# Patient Record
Sex: MALE | Race: BLACK OR AFRICAN AMERICAN
[De-identification: names, ages, dates, MRNs, and addresses within clinical notes are randomized per-mention and may not be internally consistent; named-entity substitution may affect disease eponyms.]

---

## 2017-08-13 ENCOUNTER — HOSPITAL ENCOUNTER (EMERGENCY)
Dept: HOSPITAL 74 - JER | Age: 52
Discharge: HOME | End: 2017-08-13
Payer: COMMERCIAL

## 2017-08-13 VITALS — HEART RATE: 73 BPM | DIASTOLIC BLOOD PRESSURE: 103 MMHG | SYSTOLIC BLOOD PRESSURE: 158 MMHG | TEMPERATURE: 99 F

## 2017-08-13 VITALS — BODY MASS INDEX: 26.6 KG/M2

## 2017-08-13 DIAGNOSIS — K57.92: Primary | ICD-10-CM

## 2017-08-13 DIAGNOSIS — I10: ICD-10-CM

## 2017-08-13 DIAGNOSIS — K57.90: ICD-10-CM

## 2017-08-13 LAB
ALBUMIN SERPL-MCNC: 3.9 G/DL (ref 3.4–5)
ALP SERPL-CCNC: 70 U/L (ref 45–117)
ALT SERPL-CCNC: 31 U/L (ref 12–78)
AMYLASE SERPL-CCNC: 56 U/L (ref 25–115)
ANION GAP SERPL CALC-SCNC: 7 MMOL/L (ref 8–16)
AST SERPL-CCNC: 14 U/L (ref 15–37)
BASOPHILS # BLD: 0.4 % (ref 0–2)
BILIRUB SERPL-MCNC: 1.1 MG/DL (ref 0.2–1)
CALCIUM SERPL-MCNC: 8.7 MG/DL (ref 8.5–10.1)
CO2 SERPL-SCNC: 27 MMOL/L (ref 21–32)
CREAT SERPL-MCNC: 1 MG/DL (ref 0.7–1.3)
DEPRECATED RDW RBC AUTO: 13.6 % (ref 11.9–15.9)
EOSINOPHIL # BLD: 0.8 % (ref 0–4.5)
GLUCOSE SERPL-MCNC: 99 MG/DL (ref 74–106)
MCH RBC QN AUTO: 29.3 PG (ref 25.7–33.7)
MCHC RBC AUTO-ENTMCNC: 33.2 G/DL (ref 32–35.9)
MCV RBC: 88.2 FL (ref 80–96)
NEUTROPHILS # BLD: 71.2 % (ref 42.8–82.8)
PLATELET # BLD AUTO: 189 K/MM3 (ref 134–434)
PMV BLD: 8.9 FL (ref 7.5–11.1)
PROT SERPL-MCNC: 7.3 G/DL (ref 6.4–8.2)
WBC # BLD AUTO: 12.6 K/MM3 (ref 4–10)

## 2017-08-13 PROCEDURE — 3E033NZ INTRODUCTION OF ANALGESICS, HYPNOTICS, SEDATIVES INTO PERIPHERAL VEIN, PERCUTANEOUS APPROACH: ICD-10-PCS

## 2017-08-13 NOTE — PDOC
History of Present Illness





- General


History Source: Patient


Exam Limitations: No Limitations





- History of Present Illness


Initial Comments: 


08/13/17 05:13


The patient is a 51-year-old male, with a significant past medical history of 

HTN and diverticulitis, who presents to the ED with 2 days of abdominal 

bloating and gas. Pts states that his abdominal pain is intermittent, 8/10 in 

severity. He states that this does not feel like his previous episodes of 

diverticulitis. Last episode of diverticulitis was 5 years ago. 


 


Patient denies any flank pain. He denies any chest pain. He denies any urinary 

changes.














<Dayana Orellana - Last Filed: 08/13/17 05:13>





<Yodit Núñez - Last Filed: 08/13/17 06:59>





- General


Stated Complaint: STOMACH PAIN


Time Seen by Provider: 08/13/17 05:01





Past History





<Dayana Orellana - Last Filed: 08/13/17 05:13>





- Past Medical History


HTN: Yes





- Psycho/Social/Smoking Cessation Hx


Suicidal Ideation: No


Smoking History: Never smoked





<Yodit Núñez - Last Filed: 08/13/17 06:59>





- Past Medical History


Allergies/Adverse Reactions: 


 Allergies











Allergy/AdvReac Type Severity Reaction Status Date / Time


 


No Known Allergies Allergy   Verified 08/13/17 05:12











Home Medications: 


Ambulatory Orders





Losartan Potassium 50 mg PO DAILY 09/23/16 











**Review of Systems





- Review of Systems


Able to Perform ROS?: Yes


Comments:: 


08/13/17 05:13


CONSTITUTIONAL:


Absent: fever, chills, diaphoresis, generalized weakness, malaise, loss of 

appetite


HEENT:


Absent: rhinorrhea, nasal congestion, throat pain, throat swelling, difficulty 

swallowing, 


mouth swelling, ear pain, eye pain, visual Changes


CARDIOVASCULAR:


Absent: chest pain, syncope, palpitations, irregular heart rate, lightheadedness

, peripheral 


edema


RESPIRATORY: 


Absent: cough, shortness of breath, dyspnea with exertion, orthopnea, wheezing, 

stridor, 


hemoptysis


GASTROINTESTINAL:


Present: abdominal pain, abdominal distension/gas


Absent: nausea, vomiting, diarrhea, constipation, 


melena, hematochezia


GENITOURINARY: 


Absent: dysuria, frequency, urgency, hesitancy, hematuria, flank pain, genital 

pain


MUSCULOSKELETAL:


Absent: myalgia, arthralgia, joint swelling


SKIN: 


Absent: rash, itching, pallor


HEMATOLOGIC/IMMUNOLOGIC: 


Absent: easy bleeding, easy bruising, lymphadenopathy, frequent infections


ENDOCRINE:


Absent: unexplained weight gain, unexplained weight loss, heat intolerance, 

cold intolerance


NEUROLOGIC: 


Absent: headache, focal weakness or paresthesias, dizziness, unsteady gait, 

seizure, mental 


status changes, bladder or bowel incontinence   


PSYCHIATRIC: 


Absent: anxiety, depression, suicidal or homicidal ideation, hallucinations.








<Dayana Orellana - Last Filed: 08/13/17 05:13>





*Physical Exam





- Physical Exam


Comments: 


08/13/17 05:15


GENERAL:


Well developed, well nourished. Awake and alert. No acute distress.


HEENT:


Normocephalic, atraumatic. PERRLA, EOMI. No conjunctival pallor. Sclera are non-

icteric. Moist mucous 


membranes. Oropharynx is clear.


NECK: 


Supple. Full ROM. No JVD. Carotid pulses 2+ and symmetric, without bruits. No 

thyromegaly. No 


lymphadenopathy.


CARDIOVASCULAR:


Regular rate and rhythm. No murmurs, rubs, or gallops. Distal pulses are 2+ and 

symmetric. 


PULMONARY: 


No evidence of respiratory distress. Lungs clear to auscultation bilaterally. 

No wheezing, rales or rhonchi.


ABDOMINAL:


Soft. Non-tender. Non-distended. No rebound or guarding. No organomegaly. 

Normoactive bowel sounds. 


MUSCULOSKELETAL 


(+)Gassy bowel sounds. Normal range of motion at all joints. No bony 

deformities or tenderness. No CVA tenderness.


EXTREMITIES: 


No cyanosis. No clubbing. No edema. No calf tenderness.


SKIN: 


Warm and dry. Normal capillary refill. No rashes. No jaundice. 


NEUROLOGICAL: 


Alert, awake, appropriate. Cranial nerves 2-12 intact. 


PSYCHIATRIC: 


Cooperative. Good eye contact. Appropriate mood and affect.








<Dayana Orellana - Last Filed: 08/13/17 05:13>





ED Treatment Course





- LABORATORY


CBC & Chemistry Diagram: 


 08/13/17 05:06





 08/13/17 05:06





<Yodit Núñez - Last Filed: 08/13/17 06:59>





Medical Decision Making





- Medical Decision Making


08/13/17 06:34


Labs normal; awaiting CT scan





08/13/17 06:58


Pt comes with gassy sensation and abdominal pain that is diffuse.  Doesn't feel 

like diverticulitis which he has had in the past.


Pt has constipation and decreased BMs; he wonders if he has obstruction.


Pt appears well, but he has pain.  He will be hydrated, as he has decreased 

appetite and morphine.





Labs normal.  Pt drank oral contrast.  CT scan abd/pelvis at 7AM


Sign out to morning ER doc.





<Yodit Núñez - Last Filed: 08/13/17 06:59>





*DC/Admit/Observation/Transfer





- Attestations


Scribe Attestion: 


08/13/17 05:16





Documentation prepared by Dayana Orellana, acting as medical scribe for Yodit Núñez MD.





<Dayana Orellana - Last Filed: 08/13/17 05:13>

## 2017-08-13 NOTE — PDOC
*Physical Exam





- Vital Signs


 Last Vital Signs











Temp Pulse Resp BP Pulse Ox


 


 99.0 F   73   20   158/103   100 


 


 08/13/17 05:02  08/13/17 05:02  08/13/17 05:02  08/13/17 05:02  08/13/17 05:02














ED Treatment Course





- LABORATORY


CBC & Chemistry Diagram: 


 08/13/17 05:06





 08/13/17 05:06





- ADDITIONAL ORDERS


Additional order review: 


 Laboratory  Results











  08/13/17





  05:06


 


Sodium  139


 


Potassium  4.1


 


Chloride  105


 


Carbon Dioxide  27


 


Anion Gap  7 L


 


BUN  16


 


Creatinine  1.0


 


Creat Clearance w eGFR  > 60


 


Random Glucose  99


 


Calcium  8.7


 


Total Bilirubin  1.1 H


 


AST  14 L


 


ALT  31


 


Alkaline Phosphatase  70


 


Total Protein  7.3


 


Albumin  3.9


 


Total Amylase  56


 


Lipase  61 L








 











  08/13/17





  05:06


 


RBC  5.02


 


MCV  88.2


 


MCHC  33.2


 


RDW  13.6


 


MPV  8.9


 


Neutrophils %  71.2


 


Lymphocytes %  17.9


 


Monocytes %  9.7


 


Eosinophils %  0.8


 


Basophils %  0.4














- Medications


Given in the ED: 


ED Medications














Discontinued Medications














Generic Name Dose Route Start Last Admin





  Trade Name Freq  PRN Reason Stop Dose Admin


 


Morphine Sulfate  2 mg 08/13/17 05:06 08/13/17 05:12





  Morphine Injection -  IVPUSH 08/13/17 05:07  2 mg





  ONCE ONE   Administration


 


Sodium Chloride  1,000 ml 08/13/17 05:06 08/13/17 05:25





  Normal Saline -  IV 08/13/17 05:07  1,000 ml





  ONCE ONE   Administration














Medical Decision Making





- Medical Decision Making





08/13/17 08:16





Sign-out received from outgoing Emergency Physician Dr. Núñez


Pt interviewed and examined


Ancillary studies reviewed





Case discussed in detail with oncoming Emergency Physician including history, 

physical exam and ancillary studies.  





 Vital Signs











Temp Pulse Resp BP Pulse Ox


 


 99.0 F   73   20   158/103   100 


 


 08/13/17 05:02  08/13/17 05:02  08/13/17 05:02  08/13/17 05:02  08/13/17 05:02








Signed out the 51-year-old male with history of hypertension presenting with 

lower abdominal pain. Blood work reviewed and demonstrates a mild leukocytosis 

of 12 and a CAT scan abdomen pelvis demonstrates acute diverticulitis with no 

other competitions. Patient reports feeling much better and wanted to go home. 

We'll write 2 week prescription of ciprofloxacin and Flagyl. Patient has follow-

up with his gastroenterologist and primary care physician. Return precautions 

given.





I discussed the physical exam findings, ancillary test results and final 

diagnoses with the patient.  I answered all of the patient's questions.  The 

patient was satisfied with the care received and felt comfortable with the 

discharge plan and treatment plan.  The patient will call their primary care 

physician within 24 hours to arrange follow-up and will return to the Emergency 

Department with any new, persistant or worsening symptoms. 





*DC/Admit/Observation/Transfer


Diagnosis at time of Disposition: 


Diverticulitis


Qualifiers:


 Diverticulitis site: unspecified part of intestinal tract Diverticulitis 

bleeding: without bleeding Diverticulitis complication: unspecified 

complication status Qualified Code(s): K57.92 - Diverticulitis of intestine, 

part unspecified, without perforation or abscess without bleeding





- Discharge Dispostion


Disposition: HOME


Admit: No





- Prescriptions


Prescriptions: 


Ciprofloxacin [Cipro -] 500 mg PO Q12H #28 tablet


Metronidazole [Flagyl -] 500 mg PO Q8H #42 tablet


Naproxen [Naprosyn -] 500 mg PO BID PRN #20 tablet


 PRN Reason: Pain





- Referrals


Referrals: 


Krystal Marrero MD [Primary Care Provider] - 





- Patient Instructions


Printed Discharge Instructions:  DI for Diverticulitis


Additional Instructions: 


Please take your antibiotics (ciprofloxacin and flagyl) as prescribed for 2 

weeks.


Follow up with your doctor.


Drink plenty of fluids and rest.

## 2017-10-17 ENCOUNTER — HOSPITAL ENCOUNTER (EMERGENCY)
Dept: HOSPITAL 74 - JERFT | Age: 52
Discharge: HOME | End: 2017-10-17
Payer: COMMERCIAL

## 2017-10-17 VITALS — SYSTOLIC BLOOD PRESSURE: 167 MMHG | DIASTOLIC BLOOD PRESSURE: 97 MMHG | TEMPERATURE: 98.1 F | HEART RATE: 82 BPM

## 2017-10-17 VITALS — BODY MASS INDEX: 27.3 KG/M2

## 2017-10-17 DIAGNOSIS — H10.33: Primary | ICD-10-CM

## 2017-10-17 NOTE — PDOC
History of Present Illness





- General


Chief Complaint: Eye Problem


Stated Complaint: EYE PROBLEM


Time Seen by Provider: 10/17/17 16:56


History Source: Patient





- History of Present Illness


Timing/Duration: other


Severity: moderate





Past History





- Past Medical History


Allergies/Adverse Reactions: 


 Allergies











Allergy/AdvReac Type Severity Reaction Status Date / Time


 


No Known Allergies Allergy   Verified 10/17/17 16:37











Home Medications: 


Ambulatory Orders





Losartan Potassium 50 mg PO DAILY 09/23/16 


Diphenhydramine HCl [Benadryl -] 25 mg PO Q6H #15 capsule 10/17/17 


Erythromycin 0.5% Eye Ointment [Erythromycin 0.5% Eye Ointment -] 1 applic OU 

DAILY #1 tube 10/17/17 


Loratadine [Claritin] 10 mg PO DAILY #7 tablet 10/17/17 








HTN: Yes





- Suicide/Smoking/Psychosocial Hx


Smoking History: Never smoked


Hx Alcohol Use: No


Drug/Substance Use Hx: No


Substance Use Type: None





**Review of Systems





- Review of Systems


Constitutional: No: Chills, Fever


HEENTM: No: Eye Pain, Blurred Vision





*Physical Exam





- Vital Signs


 Last Vital Signs











Temp Pulse Resp BP Pulse Ox


 


 98.1 F   82   18   167/97   99 


 


 10/17/17 16:37  10/17/17 16:37  10/17/17 16:37  10/17/17 16:37  10/17/17 16:37














- Physical Exam


General Appearance: Yes: Appropriately Dressed.  No: Apparent Distress


HEENT: positive: Normal Voice, Other (b/l conjunctival erythema w/ minimal 

chemosis, +tearing, no discharge at this time, no gross fb)


Respiratory/Chest: negative: Respiratory Distress


Integumentary: positive: Dry, Warm


Neurologic: positive: Fully Oriented, Alert, Normal Mood/Affect





Medical Decision Making





- Medical Decision Making


10/17/17 17:24


52-year-old male history of hypertension, here with bilateral conjunctival 

erythema with discharge and itching that started yesterday morning.  Patient 

states he noticed eyes felt "uncomfortable" after he cleaned out his basement 2 

nights ago.  Denies any foreign body or visual changes.  Does not wear contact 

lens.  No sick contacts.





See exam





B/l conjunctivitis,


Has minimal chemosis b/l which points towards an allergic component but unable 

to r/o other etiology given crusting/discharge in am


-Dc w/ antihistamine/cool compresses and erythromycin ointment








10/17/17 17:28








*DC/Admit/Observation/Transfer


Diagnosis at time of Disposition: 


Conjunctivitis


Qualifiers:


 Conjunctivitis type: acute Acute conjunctivitis type: unspecified Laterality: 

bilateral Qualified Code(s): H10.33 - Unspecified acute conjunctivitis, 

bilateral; H10.33 - Unspecified acute conjunctivitis, bilateral





- Discharge Dispostion


Disposition: HOME





- Prescriptions


Prescriptions: 


Diphenhydramine HCl [Benadryl -] 25 mg PO Q6H #15 capsule


Loratadine [Claritin] 10 mg PO DAILY #7 tablet


Erythromycin 0.5% Eye Ointment [Erythromycin 0.5% Eye Ointment -] 1 applic OU 

DAILY #1 tube





- Patient Instructions


Printed Discharge Instructions:  Conjunctivitis


Additional Instructions: 


The cause of your symptoms are either viral, bacterial or allergic.  Use 

ointment as directed and take antihistamines as needed for itching and 

swelling.  You can also apply cool compresses to eyes as needed for comfort


Do not rub eyes as can worsen symptoms especially if there is an allergic 

component


If symptoms continue, return to ED, otherwise follow-up with your doctor as 

needed





- Post Discharge Activity


Forms/Work/School Notes:  Back to Work

## 2018-04-10 ENCOUNTER — HOSPITAL ENCOUNTER (EMERGENCY)
Dept: HOSPITAL 74 - JERFT | Age: 53
Discharge: HOME | End: 2018-04-10
Payer: COMMERCIAL

## 2018-04-10 VITALS — BODY MASS INDEX: 27.3 KG/M2

## 2018-04-10 VITALS — SYSTOLIC BLOOD PRESSURE: 155 MMHG | DIASTOLIC BLOOD PRESSURE: 95 MMHG | HEART RATE: 66 BPM

## 2018-04-10 VITALS — TEMPERATURE: 98.9 F

## 2018-04-10 DIAGNOSIS — I10: ICD-10-CM

## 2018-04-10 DIAGNOSIS — J01.80: Primary | ICD-10-CM

## 2018-04-10 NOTE — PDOC
History of Present Illness





- General


Chief Complaint: Cold Symptoms


Stated Complaint: GENERALIZED PAIN


Time Seen by Provider: 04/10/18 08:15


History Source: Patient


Exam Limitations: No Limitations





- History of Present Illness


Initial Comments: 





04/10/18 09:18


Patient is a 52-year-old male with history of hypertension presents with 

generalized bodyaches, tactile fever, cough, since Sunday. Patient reports "I 

think I have the flu".  Took TheraFlu this morning. Currently afebrile. Patient 

reports he attempted to go to work today however felt dizzy. Denies any chest 

pain or shortness of breath, no sore throat. Patient with nasal congestion.





Past Medical History: Denies.  





Allergies: No known allergies





Medications: See medication list





Family History: Non-contributory





Social History: Denies smoking, alcohol use, or IVDU





Review of Systems


GENERAL/CONSTITUTIONAL: Tactile fever and bodyaches. No weakness. No weight 

change.


HEAD, EYES, EARS, NOSE AND THROAT: No change in vision. No ear pain or 

discharge. No sore throat. 


CARDIOVASCULAR: No chest pain or shortness of breath.


RESPIRATORY: Nonproductive cough, wheezing, or hemoptysis.


GASTROINTESTINAL: No nausea, vomiting, diarrhea or constipation. No rectal 

bleeding.


GENITOURINARY: No dysuria, frequency, or change in urination.


MUSCULOSKELETAL: No joint or muscle swelling or pain. No neck or back pain.


SKIN AND BREASTS: No rash or easy bruising.


NEUROLOGIC: No headache, vertigo, loss of consciousness, or loss of sensation.


PSYCHIATRIC: No depression or anxiety.


ENDOCRINE: No increased thirst. No abnormal weight change.


HEMATOLOGIC/LYMPHATIC: No anemia, easy bleeding, or history of blood clots.


ALLERGIC/IMMUNOLOGIC: No hives or skin allergy. No latex allergy.





Physical Exam: 


GENERAL: The patient is awake, alert, and fully oriented, in no acute distress.


EYES: Pupils equal, round and reactive to light, extraocular movements intact, 

sclera anicteric, conjunctiva clear.


ENT: Ears normal, nares patent, oropharynx clear without exudates.  Moist 

mucous membranes. No uvula deviation. Frontal sinus pressure. 


NECK: Normal range of motion, supple without lymphadenopathy, JVD, or masses.


LUNGS: Breath sounds equal, clear to auscultation bilaterally.  No wheezes, and 

no crackles.


HEART: Regular rate and rhythm, normal S1 and S2 without murmur, rub or gallop.


ABDOMEN: Soft, nontender, normoactive bowel sounds.  No guarding, no rebound.  

No masses. No bruising or abrasions


MUSCULOSKELETAL: Normal range of motion, no edema.  No clubbing or cyanosis. No 

cords, erythema, or tenderness. No CVA Tenderness with fist.


NEUROLOGICAL: Cranial nerves II through XII grossly intact.  Normal speech, 

normal gait.


SKIN: Warm, Dry, normal turgor, no rashes or lesions noted.








04/10/18 09:31








Past History





- Past Medical History


Allergies/Adverse Reactions: 


 Allergies











Allergy/AdvReac Type Severity Reaction Status Date / Time


 


No Known Allergies Allergy   Verified 04/10/18 07:12











Home Medications: 


Ambulatory Orders





Losartan Potassium 50 mg PO DAILY 09/23/16 


Amox-Tr/K Cl [Augmentin - 875Mg Tablet] 1 tab PO BID #14 tablet 04/10/18 


Fluticasone Prop 0.05% Nasal [Flonase -] 1 - 2 spray NS BID #1 spray.pump 04/10/

18 








COPD: No


HTN: Yes





- Suicide/Smoking/Psychosocial Hx


Smoking History: Never smoked


Information on smoking cessation initiated: No


Hx Alcohol Use: No


Drug/Substance Use Hx: No


Substance Use Type: None





*Physical Exam





- Vital Signs


 Last Vital Signs











Temp Pulse Resp BP Pulse Ox


 


 98.4 F   66   19   155/95   99 


 


 04/10/18 07:10  04/10/18 07:10  04/10/18 07:10  04/10/18 07:10  04/10/18 07:10














ED Treatment Course





- ADDITIONAL ORDERS


Additional order review: 














 04/10/18 08:20 Influenza Types A,B Antigen ([facility]URDES) - Final





 Nasopharyngeal Swab  - Final














- Medications


Given in the ED: 


ED Medications














Discontinued Medications














Generic Name Dose Route Start Last Admin





  Trade Name Freq  PRN Reason Stop Dose Admin


 


Ibuprofen  600 mg  04/10/18 08:20  04/10/18 08:36





  Motrin -  PO  04/10/18 08:21  600 mg





  ONCE ONE   Administration














Medical Decision Making





- Medical Decision Making





04/10/18 09:20


A/P: Patient with fever, generalized body aches and cough, influenza-type 

illness. Rapid influenza sent,  Motrin given. Will reevaluate.


Patient rapid influenza is negative we'll DC patient on Augmentin and Flonase, 

clinical signs of a sinusitis.  I discussed the physical exam findings, 

ancillary test results and final diagnoses with the patient. I answered all of 

the patient's questions.


The patient was satisfied with the care received and felt comfortable with the 

discharge plan and treatment plan. 


The patient will call to arrange follow-up and will return to the Emergency 

Department with any new, persistent or worsening symptoms. 








*DC/Admit/Observation/Transfer


Diagnosis at time of Disposition: 


 Sinusitis








- Discharge Dispostion


Disposition: HOME


Condition at time of disposition: Stable


Admit: No





- Prescriptions


Prescriptions: 


Amox-Tr/K Cl [Augmentin - 875Mg Tablet] 1 tab PO BID #14 tablet


Fluticasone Prop 0.05% Nasal [Flonase -] 1 - 2 spray NS BID #1 spray.pump





- Referrals


Referrals: 


Miguelito Recinos MD [Staff Physician] - 





- Patient Instructions


Printed Discharge Instructions:  Sinusitis


Additional Instructions: 


Medications as prescribed, follow-up with primary care doctor in 2 days if 

symptoms persist. If any fever, increased symptoms, or any other concerns may 

return immediately to ER.





- Post Discharge Activity


Forms/Work/School Notes:  Back to Work

## 2018-04-11 ENCOUNTER — HOSPITAL ENCOUNTER (EMERGENCY)
Dept: HOSPITAL 74 - JER | Age: 53
LOS: 1 days | Discharge: HOME | End: 2018-04-12
Payer: COMMERCIAL

## 2018-04-11 VITALS — BODY MASS INDEX: 27.3 KG/M2

## 2018-04-11 VITALS — TEMPERATURE: 98.2 F | SYSTOLIC BLOOD PRESSURE: 137 MMHG | HEART RATE: 62 BPM | DIASTOLIC BLOOD PRESSURE: 88 MMHG

## 2018-04-11 DIAGNOSIS — I10: ICD-10-CM

## 2018-04-11 DIAGNOSIS — R52: Primary | ICD-10-CM

## 2018-04-11 NOTE — EKG
Test Reason : 

Blood Pressure : ***/*** mmHG

Vent. Rate : 060 BPM     Atrial Rate : 060 BPM

   P-R Int : 170 ms          QRS Dur : 094 ms

    QT Int : 392 ms       P-R-T Axes : 066 048 046 degrees

   QTc Int : 392 ms

 

NORMAL SINUS RHYTHM

MINIMAL VOLTAGE CRITERIA FOR LVH, MAY BE NORMAL VARIANT

BORDERLINE ECG

WHEN COMPARED WITH ECG OF 23-SEP-2010 07:38,

NO SIGNIFICANT CHANGE WAS FOUND

Confirmed by GEO BANKS, EDWIN ([id number]) on 4/11/2018 10:55:08 AM

 

Referred By:             Confirmed By:EDWIN GUARDADO MD

## 2018-04-11 NOTE — PDOC
History of Present Illness





- General


History Source: Patient


Exam Limitations: No Limitations





- History of Present Illness


Initial Comments: 





04/11/18 23:47


The patient is a 52 year old male, with a significant past medical history of 

hypertension, who presents to the emergency department with joint aches, nasal 

congestion, and subjective fever for approximately 4 days. Patient reports 

presenting to the ED yesterday with flu like symptoms, and states he had a flu 

swab done which was negative and he was started on antibiotics. Patient denies 

any associated runny nose, sore throat, ear aches, chills, cough, headache, or 

dizziness. Patient reports chest and joint aches that are exacerbated with 

exertion. He denies any abdominal pain, nausea, vomiting, diarrhea, or 

constipation. Patient  denies any shortness of breath, diaphoresis, or 

palpitations. He denies any recent travel or sick contacts.





Allergies: NKDA


Past Surgical History: None reported.


Social History: Non smoker. No ETOH or recreational drug use.








<Sharon Reyna - Last Filed: 04/11/18 23:46>





<Laura Person - Last Filed: 04/12/18 01:04>





- General


Chief Complaint: Cold Symptoms


Stated Complaint: CHEST PAIN


Time Seen by Provider: 04/11/18 23:36





Past History





<Sharon Reyna - Last Filed: 04/11/18 23:46>





- Past Medical History


COPD: No


HTN: Yes





- Suicide/Smoking/Psychosocial Hx


Smoking History: Never smoked


Have you smoked in the past 12 months: No


Information on smoking cessation initiated: No


Hx Alcohol Use: No


Drug/Substance Use Hx: No


Substance Use Type: None





<Laura Person - Last Filed: 04/12/18 01:04>





- Past Medical History


Allergies/Adverse Reactions: 


 Allergies











Allergy/AdvReac Type Severity Reaction Status Date / Time


 


shellfish derived Allergy   Verified 04/11/18 23:04











Home Medications: 


Ambulatory Orders





Losartan Potassium 50 mg PO DAILY 09/23/16 


Amox-Tr/K Cl [Augmentin - 875Mg Tablet] 1 tab PO BID #14 tablet 04/10/18 


Fluticasone Prop 0.05% Nasal [Flonase -] 1 - 2 spray NS BID #1 spray.pump 04/10/

18 











**Review of Systems





- Review of Systems


Able to Perform ROS?: Yes


Comments:: 





04/11/18 23:46


GENERAL/CONSTITUTIONAL: +Fever.  No chills. No weakness.


HEAD, EYES, EARS, NOSE AND THROAT: +Nasal congestion. No runny nose. No change 

in vision. No ear pain or discharge. No sore throat.


GASTROINTESTINAL: No nausea, vomiting, diarrhea or constipation.


GENITOURINARY: No dysuria, frequency, or change in urination.


CARDIOVASCULAR: +Chest aches. No shortness of breath.


RESPIRATORY: No cough, wheezing, or hemoptysis.


MUSCULOSKELETAL: Yes diffuse joint aches. No muscle swelling or pain. No neck 

or back pain.


SKIN: No rash


NEUROLOGIC: No headache, vertigo, loss of consciousness, or change in strength/

sensation.


ENDOCRINE: No increased thirst. No abnormal weight change.


HEMATOLOGIC/LYMPHATIC: No anemia, easy bleeding, or history of blood clots.


ALLERGIC/IMMUNOLOGIC: No hives or skin allergy.








<Sharon Reyna - Last Filed: 04/11/18 23:46>





*Physical Exam





- Vital Signs


 Last Vital Signs











Temp Pulse Resp BP Pulse Ox


 


 98.2 F   62   16   137/88   100 


 


 04/11/18 23:01  04/11/18 23:01  04/11/18 23:01  04/11/18 23:01  04/11/18 23:01














- Physical Exam


Comments: 





04/11/18 23:46


Constitutional: Awake, alert, oriented.  No acute distress.


Head:  Normocephalic.  Atraumatic


Eyes:  PERRL. EOMI.  Conjunctivae are not pale.


ENT:  Mucous membranes are moist and intact. Posterior pharynx without exudates 

or erythema. Uvula midline.


Neck:  Supple.  Full ROM. No lymphadenopathy.


Cardiovascular:  Regular rate.  Regular rhythm. S1, S2 regular.  Distal pulses 

are 2+ and symmetric.  


Pulmonary/Chest:  No evidence of respiratory distress.  Clear to auscultation 

bilaterally  No wheezing, rales or rhonchi.


Abdominal:  Soft and non-distended.  There is no tenderness.  No rebound, 

guarding or rigidity.  No organomegaly. No palpable masses. Good bowel sounds.


Back:  No CVA tenderness.


Musculoskeletal:  No edema.  No cyanosis.  No clubbing.  Full range of motion 

in all extremities.  No calf tenderness. Radial/pedal pulses are intact and 2+ 

bilaterally


Skin:  Skin is warm and dry.  No petechiae.  No purpura.  


Neurological:  Alert and oriented to person, place, and time.  Cranial nerves II

-XII are grossly intact. Normal speech. Strength is grossly symmetric. No 

sensory deficits.


Psychiatric:  Good eye contact.  Normal interaction, affect and behavior.








<Sharon Reyna - Last Filed: 04/11/18 23:46>





- Vital Signs


 Last Vital Signs











Temp Pulse Resp BP Pulse Ox


 


 98.2 F   62   16   137/88   100 


 


 04/11/18 23:01  04/11/18 23:01  04/11/18 23:01  04/11/18 23:01  04/11/18 23:01














<Laura Person - Last Filed: 04/12/18 01:04>





**Heart Score/ECG Review





- ECG Intrepretation


Comment:: 





04/11/18 23:42


sinus rafaela at 56, lvh, nl axis, nl interval, early repol anterior leads, no 

acute st/t wave findings





<Laura Person - Last Filed: 04/12/18 01:04>





Medical Decision Making





- Medical Decision Making





04/11/18 23:42


a/p: 53yo male with body aches since last weekend


-still achy today


-chest aches when movement


-no wheezing


no rhonchi or rales


heart RRR


no sinus ttp


post pharynx clear


will check ekg, cxr, motrin


pt requesting work note because he feels to achy to go to work tomorrow


04/12/18 00:11


flu swab was negative 2 days ago


states feeling better on abx


still with body aches


04/12/18 00:59


re-eval: pt feeling better


cxr clear


flu negative 2 days ago


pt on augmentin and flonase


stable for d/c to home


discussed all reasons to return to the ED and need for follow up





<Laura Person - Last Filed: 04/12/18 01:04>





*DC/Admit/Observation/Transfer





- Attestations


Scribe Attestion: 





04/11/18 23:47





Documentation prepared by Sharon Reyna, acting as medical scribe for Laura Person DO.





<Sharon Reyna - Last Filed: 04/11/18 23:46>





- Discharge Dispostion


Admit: No





- Attestations


Physician Attestion: 





04/11/18 23:44








I, Dr. Laura Person, DO, attest that this document has been prepared under 

my direction and personally reviewed by me in its entirety.   I further attest, 

that it accurately reflects all work, treatment, procedures and medical decision

-making performed by me.  





<Laura Person - Last Filed: 04/12/18 01:04>


Diagnosis at time of Disposition: 


 Body aches








- Discharge Dispostion


Disposition: HOME


Condition at time of disposition: Stable





- Referrals


Referrals: 


Jesse Arzate MD [Staff Physician] - 





- Patient Instructions


Printed Discharge Instructions:  Acute Bronchitis


Additional Instructions: 


Please continue the antibiotics. Please return to the ED with any further 

concerns. Please make an appointment to see the PMD. Please alternate tylenol 

or motrin for the body aches. Please measure your temperature. 





- Post Discharge Activity


Forms/Work/School Notes:  Back to Work

## 2018-04-12 NOTE — EKG
Test Reason : 

Blood Pressure : ***/*** mmHG

Vent. Rate : 056 BPM     Atrial Rate : 056 BPM

   P-R Int : 158 ms          QRS Dur : 094 ms

    QT Int : 390 ms       P-R-T Axes : 063 057 050 degrees

   QTc Int : 376 ms

 

SINUS BRADYCARDIA

MODERATE VOLTAGE CRITERIA FOR LVH, MAY BE NORMAL VARIANT

BORDERLINE ECG

WHEN COMPARED WITH ECG OF 10-APR-2018 07:18,

NO SIGNIFICANT CHANGE WAS FOUND

Confirmed by VENKAT BANKS, ABBY ([id number]) on 4/12/2018 1:30:57 PM

 

Referred By:             Confirmed By:ABBY ROBLEDO MD

## 2021-08-05 ENCOUNTER — HOSPITAL ENCOUNTER (EMERGENCY)
Dept: HOSPITAL 74 - JER | Age: 56
Discharge: HOME | End: 2021-08-05
Payer: COMMERCIAL

## 2021-08-05 VITALS — TEMPERATURE: 97.7 F | SYSTOLIC BLOOD PRESSURE: 135 MMHG | HEART RATE: 52 BPM | DIASTOLIC BLOOD PRESSURE: 92 MMHG

## 2021-08-05 VITALS — BODY MASS INDEX: 26.1 KG/M2

## 2021-08-05 DIAGNOSIS — R10.13: Primary | ICD-10-CM

## 2021-08-05 DIAGNOSIS — K21.9: ICD-10-CM

## 2021-08-05 LAB
ALBUMIN SERPL-MCNC: 3.9 G/DL (ref 3.4–5)
ALP SERPL-CCNC: 64 U/L (ref 45–117)
ALT SERPL-CCNC: 29 U/L (ref 13–61)
ANION GAP SERPL CALC-SCNC: 6 MMOL/L (ref 8–16)
AST SERPL-CCNC: 17 U/L (ref 15–37)
BASOPHILS # BLD: 0.5 % (ref 0–2)
BILIRUB SERPL-MCNC: 1.3 MG/DL (ref 0.2–1)
BUN SERPL-MCNC: 12.7 MG/DL (ref 7–18)
CALCIUM SERPL-MCNC: 9 MG/DL (ref 8.5–10.1)
CHLORIDE SERPL-SCNC: 106 MMOL/L (ref 98–107)
CO2 SERPL-SCNC: 30 MMOL/L (ref 21–32)
CREAT SERPL-MCNC: 1.1 MG/DL (ref 0.55–1.3)
DEPRECATED RDW RBC AUTO: 13.6 % (ref 11.9–15.9)
EOSINOPHIL # BLD: 1.1 % (ref 0–4.5)
GLUCOSE SERPL-MCNC: 88 MG/DL (ref 74–106)
HCT VFR BLD CALC: 44.5 % (ref 35.4–49)
HGB BLD-MCNC: 14.9 GM/DL (ref 11.7–16.9)
LIPASE SERPL-CCNC: 57 U/L (ref 73–393)
LYMPHOCYTES # BLD: 15.7 % (ref 8–40)
MCH RBC QN AUTO: 29.6 PG (ref 25.7–33.7)
MCHC RBC AUTO-ENTMCNC: 33.5 G/DL (ref 32–35.9)
MCV RBC: 88.3 FL (ref 80–96)
MONOCYTES # BLD AUTO: 9.6 % (ref 3.8–10.2)
NEUTROPHILS # BLD: 73.1 % (ref 42.8–82.8)
PLATELET # BLD AUTO: 175 10^3/UL (ref 134–434)
PMV BLD: 8.8 FL (ref 7.5–11.1)
PROT SERPL-MCNC: 7.2 G/DL (ref 6.4–8.2)
RBC # BLD AUTO: 5.04 M/MM3 (ref 4–5.6)
SODIUM SERPL-SCNC: 142 MMOL/L (ref 136–145)
WBC # BLD AUTO: 12.3 K/MM3 (ref 4–10)